# Patient Record
Sex: MALE | Race: ASIAN | NOT HISPANIC OR LATINO | Employment: FULL TIME | URBAN - METROPOLITAN AREA
[De-identification: names, ages, dates, MRNs, and addresses within clinical notes are randomized per-mention and may not be internally consistent; named-entity substitution may affect disease eponyms.]

---

## 2017-11-18 ENCOUNTER — OFFICE VISIT (OUTPATIENT)
Dept: ORTHOPEDICS | Facility: CLINIC | Age: 32
End: 2017-11-18

## 2017-11-18 VITALS
HEIGHT: 72 IN | HEART RATE: 68 BPM | BODY MASS INDEX: 23.16 KG/M2 | SYSTOLIC BLOOD PRESSURE: 136 MMHG | DIASTOLIC BLOOD PRESSURE: 87 MMHG | WEIGHT: 171 LBS

## 2017-11-18 DIAGNOSIS — M54.50 BILATERAL LOW BACK PAIN WITHOUT SCIATICA, UNSPECIFIED CHRONICITY: Primary | ICD-10-CM

## 2017-11-18 NOTE — PATIENT INSTRUCTIONS
Take ibuprofen(advil, motrin) 600mg three times daily with food     OR  Naproxen(aleve) two tabs twice daily with food     Follow up with physical therapy  Follow up in clinic in 4-6 weeks if no improvement

## 2017-11-18 NOTE — LETTER
Date:November 20, 2017      Patient was self referred, no letter generated. Do not send.        [facility] Health Information

## 2017-11-18 NOTE — MR AVS SNAPSHOT
After Visit Summary   2017    Prasanna Alegria    MRN: [id number]           Patient Information     Date Of Birth          1985        Visit Information        Provider Department      2017 9:30 AM Ck Duvall MD [facility]        Today's Diagnoses     Bilateral low back pain without sciatica, unspecified chronicity    -  1      Care Instructions    Take ibuprofen(advil, motrin) 600mg three times daily with food     OR  Naproxen(aleve) two tabs twice daily with food     Follow up with physical therapy  Follow up in clinic in 4-6 weeks if no improvement            Follow-ups after your visit        Additional Services     PHYSICAL THERAPY REFERRAL (Internal)       Physical Therapy Referral                  Who to contact     Please call your clinic at [phone] to:    Ask questions about your health    Make or cancel appointments    Discuss your medicines    Learn about your test results    Speak to your doctor   If you have compliments or concerns about an experience at your clinic, or if you wish to file a complaint, please contact [facility] Patient Relations at [phone] or email us at [email or web address]         Additional Information About Your Visit        MyChart Information     [facility] is an electronic gateway that provides easy, online access to your medical records. With Omaze, you can request a clinic appointment, read your test results, renew a prescription or communicate with your care team.     To sign up for [facility] visit the website at [email or web address]   You will be asked to enter the access code listed below, as well as some personal information. Please follow the directions to create your username and password.     Your access code is: [email or web address]  Expires: 2018 11:40 AM     Your access code will  in 90 days. If you need help or a new code, please  "contact your [facility] or call [phone] for assistance.        Care EveryWhere ID     This is your Care EveryWhere ID. This could be used by other organizations to access your [place] medical records  [id number]        Your Vitals Were     Pulse Height BMI (Body Mass Index)             68 5' 11.5\" (1.816 m) 23.52 kg/m2          Blood Pressure from Last 3 Encounters:   11/18/17 136/87    Weight from Last 3 Encounters:   11/18/17 171 lb (77.6 kg)              We Performed the Following     PHYSICAL THERAPY REFERRAL (Internal)        Primary Care Provider    None Specified       No primary provider on file.        Equal Access to Services     [facility]: Hadii khadijah Kendrick, waloganda robertadaha, alfredoybprashant kaalmada samy, binh ruelas . So [facility] [phone].    ATENCIÓN: Si habla español, tiene a oconnor disposición servicios gratuitos de asistencia lingüística. Llame al [phone].    We comply with applicable federal civil rights laws and [place] laws. We do not discriminate on the basis of race, color, national origin, age, disability, sex, sexual orientation, or gender identity.            Thank you!     Thank you for choosing [facility]  for your care. Our goal is always to provide you with excellent care. Hearing back from our patients is one way we can continue to improve our services. Please take a few minutes to complete the written survey that you may receive in the mail after your visit with us. Thank you!             Your Updated Medication List - Protect others around you: Learn how to safely use, store and throw away your medicines at www.disposemymeds.org.      Notice  As of 11/18/2017 11:40 AM    You have not been prescribed any medications.      "

## 2017-11-18 NOTE — LETTER
"11/18/2017       RE: Prasanna Alegria  [address]   [place] [address]     Dear Colleague,    Thank you for referring your patient, Prasanna Alegria, to the [facility] at [facility]. Please see a copy of my visit note below.    [facility] Walk-in Clinic Note      Patient is a 32 year old male who presents to the office today for: low back pain. Had injury in 2010 which describes as mostly muscular.  He treated with rest and physical therapy at the time which led to improvement. Since that time he has had infrequent episodes of back pain, usually only lasting a few days. He wears a back brace for any heavy lifting. Roughly 1 month ago he was doing work around the house and the following day had back pain.    Began: one month ago. No acute injury or trauma  Located:Low back, laterally. No midline pain  Quality:Achy, sore  Aggravating factors:prolonged sitting, flexion  Alleviating factors:heat, ice, has not taken any OTC medications  Prior injury: yes, 2010  Denies weakness, numbness, tingling, clicking, locking, or catching.      Past Medical History, Current Medications, and Allergies are reviewed in the electronic medical record as appropriate.     ROS: Pertinent items are noted in HPI.  Constitutional: negative for fevers, chills and malaise  Cardiovascular: negative for dyspnea, fatigue, lower extremity edema  Integument/breast: negative for rash, skin lesion(s) and skin color change  Neurological: negative for paresthesia and weakness      EXAM:/87  Pulse 68  Ht 1.816 m (5' 11.5\")  Wt 77.6 kg (171 lb)  BMI 23.52 kg/m2    General: alert, pleasant, no distress. sitting comfortably in chair  Back/Spine: no tenderness to palpation of spinous processes, or paraspinous musculature of lumbar spine. MildTTP in SI area on left. full ROM with flexion, extension, twisting, and side bending without " pain. Straight leg raise negative bilaterally. hamstring tightness noted. NO pain in back with GRETEL testing bilaterally  Neuro: SILT on bilateral lower extremities, can stand on toes and heel walk without difficulty, patellar and achilles reflexes 2+ and symmetric,    Radiographs: None      Assessment: Patient is a 32 year old male with low back pain and some findings consistent with SI joint etiology.    Recommendations:   Given referral for physical therapy  Recommended NSAIDs for pain as needed  Recommended heat prior to activity and ice afterwards.  Follow-up in 4-6 weeks if he is not having improvement        Ck Duvall MD        Again, thank you for allowing me to participate in the care of your patient.      Sincerely,    Ck Duvall MD

## 2017-11-18 NOTE — PROGRESS NOTES
"[facility] Orthopedic Walk-in Clinic Note      Patient is a 32 year old male who presents to the office today for: low back pain. Had injury in 2010 which describes as mostly muscular.  He treated with rest and physical therapy at the time which led to improvement. Since that time he has had infrequent episodes of back pain, usually only lasting a few days. He wears a back brace for any heavy lifting. Roughly 1 month ago he was doing work around the house and the following day had back pain.    Began: one month ago. No acute injury or trauma  Located:Low back, laterally. No midline pain  Quality:Achy, sore  Aggravating factors:prolonged sitting, flexion  Alleviating factors:heat, ice, has not taken any OTC medications  Prior injury: yes, 2010  Denies weakness, numbness, tingling, clicking, locking, or catching.      Past Medical History, Current Medications, and Allergies are reviewed in the electronic medical record as appropriate.     ROS: Pertinent items are noted in HPI.  Constitutional: negative for fevers, chills and malaise  Cardiovascular: negative for dyspnea, fatigue, lower extremity edema  Integument/breast: negative for rash, skin lesion(s) and skin color change  Neurological: negative for paresthesia and weakness      EXAM:/87  Pulse 68  Ht 1.816 m (5' 11.5\")  Wt 77.6 kg (171 lb)  BMI 23.52 kg/m2    General: alert, pleasant, no distress. sitting comfortably in chair  Back/Spine: no tenderness to palpation of spinous processes, or paraspinous musculature of lumbar spine. MildTTP in SI area on left. full ROM with flexion, extension, twisting, and side bending without pain. Straight leg raise negative bilaterally. hamstring tightness noted. NO pain in back with GRETEL testing bilaterally  Neuro: SILT on bilateral lower extremities, can stand on toes and heel walk without difficulty, patellar and achilles reflexes 2+ and symmetric,    Radiographs: None      Assessment: Patient is a 32 year " old male with low back pain and some findings consistent with SI joint etiology.    Recommendations:   Given referral for physical therapy  Recommended NSAIDs for pain as needed  Recommended heat prior to activity and ice afterwards.  Follow-up in 4-6 weeks if he is not having improvement        Ck Duvall MD

## 2017-12-22 ENCOUNTER — THERAPY VISIT (OUTPATIENT)
Dept: PHYSICAL THERAPY | Facility: CLINIC | Age: 32
End: 2017-12-22
Payer: COMMERCIAL

## 2017-12-22 DIAGNOSIS — M54.50 ACUTE BILATERAL LOW BACK PAIN WITHOUT SCIATICA: Primary | ICD-10-CM

## 2017-12-22 PROCEDURE — 97530 THERAPEUTIC ACTIVITIES: CPT | Mod: GP

## 2017-12-22 PROCEDURE — 97161 PT EVAL LOW COMPLEX 20 MIN: CPT | Mod: GP

## 2017-12-22 PROCEDURE — 97110 THERAPEUTIC EXERCISES: CPT | Mod: GP

## 2017-12-22 NOTE — PROGRESS NOTES
[facility] Evaluation  Subjective:    Patient is a 32 year old male presenting with rehab back hpi.   Raji Mims is a 32 year old male with a lumbar condition.  Condition occurred with:  Other reason.  Condition occurred: other.  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 Injured then.  Since then: More recent injury 4 months ago and wondering why it keeps recurring. Has occasional setbacks when playing sport (basketball) or jumping or lifting.  Does not bother at job, but does after driving (stiffness). CC: pain low back with certain movements (bending). .    Patient reports pain:  Lower lumbar spine.  Radiates to:  No radiation.  Pain is described as sharp and aching and is intermittent and reported as 8/10.  Associated symptoms:  Loss of motion/stiffness. Worse during: Will be stiffer in the AM when bothering him.  Symptoms are exacerbated by bending and certain positions and relieved by analgesics and heat.  Since onset symptoms are unchanged.  Special testing: none recently.  Previous treatment includes physical therapy.  There was mild improvement following previous treatment.                                                Objective:    System         Lumbar/SI Evaluation  ROM:  AROM Lumbar: normal    Strength: fair TA recruitment             Lumbar Palpation:  Palpation (lumbar): mild tenderness to palpation L4, L5.        Lumbar Provocation:  Lumbar provocation: standing on left leg; instability test: inconclusive.  Left positive with:  Regis w/ext                                                   General     ROS    Assessment/Plan:      Patient is a 32 year old male with lumbar complaints.    Patient has the following significant findings with corresponding treatment plan.                Diagnosis 1:  L/S pain with s&sx consistent with possible lumbar micro-instability   Pain -  self management, education  and home program  Decreased strength - therapeutic exercise and therapeutic activities  Impaired muscle performance - neuro re-education  Decreased function - therapeutic activities    Therapy Evaluation Codes:   1) History comprised of:   Personal factors that impact the plan of care:      None.    Comorbidity factors that impact the plan of care are:      None.     Medications impacting care: None.  2) Examination of Body Systems comprised of:   Body structures and functions that impact the plan of care:      Lumbar spine.   Activity limitations that impact the plan of care are:      Bending, Jumping and Sitting.  3) Clinical presentation characteristics are:   Stable/Uncomplicated.  4) Decision-Making    Low complexity using standardized patient assessment instrument and/or measureable assessment of functional outcome.  Cumulative Therapy Evaluation is: Low complexity.    Previous and current functional limitations:  (See Goal Flow Sheet for this information)    Short term and Long term goals: (See Goal Flow Sheet for this information)     Communication ability:  Patient appears to be able to clearly communicate and understand verbal and written communication and follow directions correctly.  Treatment Explanation - The following has been discussed with the patient:   RX ordered/plan of care  Anticipated outcomes  Possible risks and side effects  This patient would benefit from PT intervention to resume normal activities.   Rehab potential is good.    Frequency:  1 X week, once daily  Duration:  for 6 weeks  Discharge Plan:  Achieve all LTG.  Independent in home treatment program.  Reach maximal therapeutic benefit.    Please refer to the daily flowsheet for treatment today, total treatment time and time spent performing 1:1 timed codes.

## 2017-12-22 NOTE — MR AVS SNAPSHOT
"              After Visit Summary   12/22/2017    Raji Mims    MRN: [id number]           Patient Information     Date Of Birth          1985        Visit Information        Provider Department      12/22/2017 8:20 AM Miguelangel Emery, OMARI GRANT [facility]        Today's Diagnoses     Acute bilateral low back pain without sciatica    -  1       Follow-ups after your visit        Your next 10 appointments already scheduled     Dec 29, 2017  4:50 PM CST   [facility] Spine with OMARI Cruz [facility] ([facility])    [address] [place] [id number] [phone]              Who to contact     If you have questions or need follow up information about today's clinic visit or your schedule please contact [facility] PH[facility] THERAP[facility] directly at [phone].  Normal or non-critical lab and imaging results will be communicated to you by MyChart, letter or phone within 4 business days after the clinic has received the results. If you do not hear from us within 7 days, please contact the clinic through Tell[facility]par[facility] or phone. If you have a critical or abnormal lab result, we will notify you by phone as soon as possible.  Submit refill requests through [facility] or call your pharmacy and they will forward the refill request to us. Please allow 3 business days for your refill to be completed.          Additional Information About Your Visit        MyChart Information     [facility] lets you send messages to your doctor, view your test results, renew your prescriptions, schedule appointments and more. To sign up, go to [email or web address] . Click on \"Log in\" on the left side of the screen, which will take you to the Welcome page. Then click on \"Sign up Now\" on the right side of the page.     You will be asked to enter the access code listed below, as well as some personal information. Please follow the directions to create " your username and password.     Your access code is: [email or web address]  Expires: 2018 11:40 AM     Your access code will  in 90 days. If you need help or a new code, please call your [facility] clinic or [phone].        Care EveryWhere ID     This is your Care EveryWhere ID. This could be used by other organizations to access your [facility] medical records  [id number]         Blood Pressure from Last 3 Encounters:   17 136/87    Weight from Last 3 Encounters:   17 77.6 kg (171 lb)              We Performed the Following     HC PT EVAL, LOW COMPLEXITY     THERAPEUTIC ACTIVITIES     THERAPEUTIC EXERCISES        Primary Care Provider    None Specified       No primary provider on file.        Equal Access to Services     [facility] : Gonzalo Kendrick, waleno escamilla, qasusyta kaalmada samy, binh ruelas . So [facility] [phone].    ATENCIÓN: Si habla español, tiene a oconnor disposición servicios gratuitos de asistencia lingüística. Llame al [phone].    We comply with applicable federal civil rights laws and [place] laws. We do not discriminate on the basis of race, color, national origin, age, disability, sex, sexual orientation, or gender identity.            Thank you!     Thank you for choosing [facility] PHYSICAL THERAP[facility]  for your care. Our goal is always to provide you with excellent care. Hearing back from our patients is one way we can continue to improve our services. Please take a few minutes to complete the written survey that you may receive in the mail after your visit with us. Thank you!             Your Updated Medication List - Protect others around you: Learn how to safely use, store and throw away your medicines at www.disposemymeds.org.      Notice  As of 2017  9:40 AM    You have not been prescribed any medications.

## 2018-03-30 ENCOUNTER — OFFICE VISIT - HEALTHEAST (OUTPATIENT)
Dept: FAMILY MEDICINE | Facility: CLINIC | Age: 33
End: 2018-03-30

## 2018-03-30 DIAGNOSIS — K62.5 BRBPR (BRIGHT RED BLOOD PER RECTUM): ICD-10-CM

## 2018-03-30 DIAGNOSIS — K59.00 CONSTIPATION: ICD-10-CM

## 2018-03-30 DIAGNOSIS — E78.1 HYPERTRIGLYCERIDEMIA: ICD-10-CM

## 2018-03-30 LAB
CHOLEST SERPL-MCNC: 228 MG/DL
FASTING STATUS PATIENT QL REPORTED: NO
HDLC SERPL-MCNC: 31 MG/DL
LDLC SERPL CALC-MCNC: 147 MG/DL
TRIGL SERPL-MCNC: 249 MG/DL

## 2018-03-30 ASSESSMENT — MIFFLIN-ST. JEOR: SCORE: 1682.88

## 2018-04-02 ENCOUNTER — COMMUNICATION - HEALTHEAST (OUTPATIENT)
Dept: FAMILY MEDICINE | Facility: CLINIC | Age: 33
End: 2018-04-02

## 2018-04-16 ENCOUNTER — AMBULATORY - HEALTHEAST (OUTPATIENT)
Dept: FAMILY MEDICINE | Facility: CLINIC | Age: 33
End: 2018-04-16

## 2018-04-16 DIAGNOSIS — E78.1 HYPERTRIGLYCERIDEMIA: ICD-10-CM

## 2018-04-27 ENCOUNTER — AMBULATORY - HEALTHEAST (OUTPATIENT)
Dept: LAB | Facility: CLINIC | Age: 33
End: 2018-04-27

## 2018-04-27 DIAGNOSIS — E78.1 HYPERTRIGLYCERIDEMIA: ICD-10-CM

## 2018-04-27 LAB
CHOLEST SERPL-MCNC: 230 MG/DL
FASTING STATUS PATIENT QL REPORTED: YES
HDLC SERPL-MCNC: 29 MG/DL
LDLC SERPL CALC-MCNC: 145 MG/DL
TRIGL SERPL-MCNC: 280 MG/DL

## 2018-05-02 ENCOUNTER — COMMUNICATION - HEALTHEAST (OUTPATIENT)
Dept: FAMILY MEDICINE | Facility: CLINIC | Age: 33
End: 2018-05-02

## 2018-05-02 DIAGNOSIS — E78.2 MIXED HYPERLIPIDEMIA: ICD-10-CM

## 2018-05-18 ENCOUNTER — COMMUNICATION - HEALTHEAST (OUTPATIENT)
Dept: ADMINISTRATIVE | Facility: CLINIC | Age: 33
End: 2018-05-18

## 2021-06-01 VITALS — WEIGHT: 161.75 LBS | BODY MASS INDEX: 22.65 KG/M2 | HEIGHT: 71 IN

## 2021-06-17 NOTE — PROGRESS NOTES
"CHOLO Ruvalcaba is a 32 y.o. male here for follow up on high triglycerides. He went to [facility] in 12/2017 and they were very high (469, reviewed in Care Everywhere). He knows there is  high cholesterol in his family. He states that when they were last checked, it was the the end of the holiday month and he had been eating a lot and not exercising. He weighed 179 lbs at that visit and has lost 18 lbs since that time by doing high intensity exercise and trying to avoid unhealthy food.     Today, he is not fasting- he had some tea and a muffin for breakfast. Would like a call with results.     Also states that in 12/2017 he had had several episodes of bright red blood in his stool and went to GI at [facility]. He was constipated. Since that time, his constipation has resolved with drinking more water and he has had no further blood in his stool. He is wondering if needs a colonoscopy. He has no family history of colon cancer.     Past Medical History:   Diagnosis Date     Hypertriglyceridemia      No current outpatient prescriptions on file prior to visit.     No current facility-administered medications on file prior to visit.        Past medical, surgical, family and social history reviewed, updated in record.   ?  ROS:   GI: No constipation, diarrhea, or abdominal pain. No blood in stool.   CV: no exercise intolerance- no chest pain or trouble breathing  ?  O  /86  Pulse 60  Temp 98.2  F (36.8  C) (Oral)   Resp 12  Ht 5' 10.5\" (1.791 m)  Wt 161 lb 12 oz (73.4 kg)  BMI 22.88 kg/m2   Vitals reviewed. Nursing note reviewed.  General Appearance: Pleasant and alert, in no acute distress  HEENT: mucous membranes moist  Skin: warm, dry, intact, no rash noted  Neuro: no focal deficits, CNs II-XII normal.   A/P  Raji was seen today for labs only.    Diagnoses and all orders for this visit:    Hypertriglyceridemia: will recheck today. If triglycerides are still elevated, could check completely " fasting to get most accurate measurement.   -     Lipid Profile  BRBPR (bright red blood per rectum)/constipation: constipation and bleeding have resolved with increased water intake. We can presume that the bleeding was due to a fissue or hemorrhoids caused by his constipation. Advised that if this recurs, he should come back to clinic and we should discuss possibly colonoscopy, but ok to hold off for now.       Options for treatment and follow-up care were reviewed with the patient and/or guardian. Raji Ruvalcaba and/or guardian engaged in the decision making process and verbalized understanding of the options discussed and agreed with the final plan.    Veronica Ring MD

## 2021-06-23 ENCOUNTER — OFFICE VISIT (OUTPATIENT)
Dept: URGENT CARE | Facility: URGENT CARE | Age: 36
End: 2021-06-23
Payer: COMMERCIAL

## 2021-06-23 VITALS
BODY MASS INDEX: 24.33 KG/M2 | HEART RATE: 66 BPM | TEMPERATURE: 98.1 F | OXYGEN SATURATION: 99 % | WEIGHT: 172 LBS | DIASTOLIC BLOOD PRESSURE: 78 MMHG | SYSTOLIC BLOOD PRESSURE: 118 MMHG

## 2021-06-23 DIAGNOSIS — S91.332A PUNCTURE WOUND OF LEFT FOOT, INITIAL ENCOUNTER: Primary | ICD-10-CM

## 2021-06-23 PROBLEM — E66.3 OVERWEIGHT: Status: ACTIVE | Noted: 2017-12-27

## 2021-06-23 PROBLEM — K62.5 BRBPR (BRIGHT RED BLOOD PER RECTUM): Status: ACTIVE | Noted: 2017-12-27

## 2021-06-23 PROBLEM — Z83.438 FH: HYPERLIPIDEMIA: Status: ACTIVE | Noted: 2017-12-27

## 2021-06-23 PROBLEM — J30.89 CHRONIC NON-SEASONAL ALLERGIC RHINITIS: Status: ACTIVE | Noted: 2017-12-27

## 2021-06-23 PROBLEM — E78.1 HYPERTRIGLYCERIDEMIA: Status: ACTIVE | Noted: 2018-03-30

## 2021-06-23 PROBLEM — H61.23 BILATERAL IMPACTED CERUMEN: Status: ACTIVE | Noted: 2017-12-27

## 2021-06-23 PROCEDURE — 90471 IMMUNIZATION ADMIN: CPT | Performed by: NURSE PRACTITIONER

## 2021-06-23 PROCEDURE — 90715 TDAP VACCINE 7 YRS/> IM: CPT | Performed by: NURSE PRACTITIONER

## 2021-06-23 PROCEDURE — 99202 OFFICE O/P NEW SF 15 MIN: CPT | Mod: 25 | Performed by: NURSE PRACTITIONER

## 2021-06-23 ASSESSMENT — ENCOUNTER SYMPTOMS
NAUSEA: 0
ADENOPATHY: 0
DIAPHORESIS: 0
PARESTHESIAS: 0
CHEST TIGHTNESS: 0
LIGHT-HEADEDNESS: 0
MYALGIAS: 0
WOUND: 1
CHILLS: 0
SLEEP DISTURBANCE: 0
DIZZINESS: 0
VOMITING: 0
FATIGUE: 0
ABDOMINAL PAIN: 0
ARTHRALGIAS: 0
PALPITATIONS: 0
WEAKNESS: 0
APPETITE CHANGE: 0
SHORTNESS OF BREATH: 0
FEVER: 0
ACTIVITY CHANGE: 0
WHEEZING: 0

## 2021-06-23 NOTE — PROGRESS NOTES
Chief Complaint   Patient presents with     Urgent Care     Puncture Wound     c/o stepped on nail today      SUBJECTIVE:  Raji Ruvalcaba is a 35 year old male who presents to the clinic today after stepping on a nail. His left foot has a very small pink puncture gretchen. The nail is silver and decently clean appearing.  Denies numbness, tingling, lost ROM. He is not sure when [place]    No past medical history on file.  No current outpatient medications on file prior to visit.  No current facility-administered medications on file prior to visit.     Social History     Tobacco Use     Smoking status: Never Smoker     Smokeless tobacco: Never Used   Substance Use Topics     Alcohol use: Not on file     No Known Allergies    Review of Systems   Constitutional: Negative for activity change, appetite change, chills, diaphoresis, fatigue and fever.   Respiratory: Negative for chest tightness, shortness of breath and wheezing.    Cardiovascular: Negative for palpitations.   Gastrointestinal: Negative for abdominal pain, nausea and vomiting.   Musculoskeletal: Negative for arthralgias, gait problem and myalgias.   Skin: Positive for wound. Negative for rash.   Neurological: Negative for dizziness, weakness, light-headedness and paresthesias.   Hematological: Negative for adenopathy.   Psychiatric/Behavioral: Negative for sleep disturbance.     EXAM:   /78   Pulse 66   Temp 98.1  F (36.7  C) (Tympanic)   Wt 78 kg (172 lb)   SpO2 99%   BMI 24.33 kg/m      Physical Exam  Vitals signs reviewed.   Constitutional:       General: He is not in acute distress.     Appearance: Normal appearance. He is not ill-appearing, toxic-appearing or diaphoretic.   HENT:      Head: Normocephalic and atraumatic.      Nose: Nose normal.      Mouth/Throat:      Mouth: Mucous membranes are moist.      Pharynx: Oropharynx is clear.   Eyes:      Extraocular Movements: Extraocular  movements intact.      Conjunctiva/sclera: Conjunctivae normal.      Pupils: Pupils are equal, round, and reactive to light.   Neck:      Musculoskeletal: Normal range of motion and neck supple.   Cardiovascular:      Rate and Rhythm: Normal rate.   Pulmonary:      Effort: Pulmonary effort is normal.   Musculoskeletal: Normal range of motion.         General: Tenderness and signs of injury present. No swelling.      Comments: Left foot plantar aspect with pinpoint pink puncture wound.   Skin:     General: Skin is warm and dry.      Findings: Erythema present. No rash.   Neurological:      General: No focal deficit present.      Mental Status: He is alert and oriented to person, place, and time.   Psychiatric:         Mood and Affect: Mood normal.         Behavior: Behavior normal.       ASSESSMENT:    ICD-10-CM    1. Puncture wound of left foot, initial encounter  S91.332A TDAP VACCINE (Adacel, Boostrix)  [[id number]]     PLAN:  Patient Instructions   Tdap updated today  Wound cleansed  Epsom salt soapy water soaks and bandaid for 3-5 days  Reevaluation if signs of infection such as redness, pus, warmth, fever    Follow up with primary care provider with any problems, questions or concerns or if symptoms worsen or fail to improve. Patient agreed to plan and verbalized understanding.    AJ Cornell-BC  [facility]

## 2021-06-23 NOTE — PATIENT INSTRUCTIONS
Tdap updated today  Wound cleansed  Epsom salt soapy water soaks and bandaid for 3-5 days  Reevaluation if signs of infection such as redness, pus, warmth, fever

## 2021-06-26 ENCOUNTER — HEALTH MAINTENANCE LETTER (OUTPATIENT)
Age: 36
End: 2021-06-26

## 2021-10-16 ENCOUNTER — HEALTH MAINTENANCE LETTER (OUTPATIENT)
Age: 36
End: 2021-10-16

## 2022-05-05 ENCOUNTER — OFFICE VISIT (OUTPATIENT)
Dept: URGENT CARE | Facility: URGENT CARE | Age: 37
End: 2022-05-05
Payer: COMMERCIAL

## 2022-05-05 VITALS
OXYGEN SATURATION: 98 % | HEART RATE: 91 BPM | DIASTOLIC BLOOD PRESSURE: 81 MMHG | SYSTOLIC BLOOD PRESSURE: 127 MMHG | TEMPERATURE: 98 F | BODY MASS INDEX: 26.03 KG/M2 | WEIGHT: 184 LBS

## 2022-05-05 DIAGNOSIS — K64.5 THROMBOSED EXTERNAL HEMORRHOIDS: Primary | ICD-10-CM

## 2022-05-05 PROCEDURE — 99213 OFFICE O/P EST LOW 20 MIN: CPT | Performed by: FAMILY MEDICINE

## 2022-05-07 NOTE — PROGRESS NOTES
Assessment & Plan     Thrombosed external hemorrhoids  This seems to be resolving on its own. No sign infection. Conservative treatment measures discussed. educational info given.        [id number]}    Return in about 1 week (around 5/12/2022) for follow up with your regular clinic if needed.    Gray Kline MD  [facility] [place]    ------------------------------------------------------------------------  Subjective     Raji Ruvalcaba presents to clinic today for the following health issues:  chief complaint  HPI  Suspected hemorrhoid bulging for about 4-5 days painful then started bleding these past 24 hours. Mildly. The pain has gone down.     No bleeding diathesis nor anticoagulants. Felt a litle feverish today and wanted to be checked for infection.       Review of Systems        Objective    /81   Pulse 91   Temp 98  F (36.7  C) (Temporal)   Wt 83.5 kg (184 lb)   SpO2 98%   BMI 26.03 kg/m    Physical Exam   GENERAL: healthy, alert and no distress  RECTAL (male): ext hemorrhoid noted. About 1cm in size. right side with area of bleeding. Overall soft and mildly tender. No surrounding erythema/fluctuance to suggest infection/abscess

## 2022-05-16 ENCOUNTER — OFFICE VISIT (OUTPATIENT)
Dept: URGENT CARE | Facility: URGENT CARE | Age: 37
End: 2022-05-16
Payer: COMMERCIAL

## 2022-05-16 VITALS
HEART RATE: 96 BPM | WEIGHT: 184 LBS | DIASTOLIC BLOOD PRESSURE: 72 MMHG | BODY MASS INDEX: 25.76 KG/M2 | RESPIRATION RATE: 16 BRPM | HEIGHT: 71 IN | SYSTOLIC BLOOD PRESSURE: 110 MMHG | TEMPERATURE: 99.5 F | OXYGEN SATURATION: 95 %

## 2022-05-16 DIAGNOSIS — R05.9 COUGH: Primary | ICD-10-CM

## 2022-05-16 DIAGNOSIS — J06.9 VIRAL URI: ICD-10-CM

## 2022-05-16 LAB
FLUAV AG SPEC QL IA: NEGATIVE
FLUBV AG SPEC QL IA: NEGATIVE
SARS-COV-2 RNA RESP QL NAA+PROBE: NEGATIVE

## 2022-05-16 PROCEDURE — U0003 INFECTIOUS AGENT DETECTION BY NUCLEIC ACID (DNA OR RNA); SEVERE ACUTE RESPIRATORY SYNDROME CORONAVIRUS 2 (SARS-COV-2) (CORONAVIRUS DISEASE [COVID-19]), AMPLIFIED PROBE TECHNIQUE, MAKING USE OF HIGH THROUGHPUT TECHNOLOGIES AS DESCRIBED BY CMS-2020-01-R: HCPCS | Performed by: PHYSICIAN ASSISTANT

## 2022-05-16 PROCEDURE — 99213 OFFICE O/P EST LOW 20 MIN: CPT | Performed by: PHYSICIAN ASSISTANT

## 2022-05-16 PROCEDURE — 87804 INFLUENZA ASSAY W/OPTIC: CPT | Performed by: PHYSICIAN ASSISTANT

## 2022-05-16 PROCEDURE — U0005 INFEC AGEN DETEC AMPLI PROBE: HCPCS | Performed by: PHYSICIAN ASSISTANT

## 2022-05-16 RX ORDER — ACETAMINOPHEN 325 MG/1
325-650 TABLET ORAL EVERY 6 HOURS PRN
COMMUNITY

## 2022-05-16 RX ORDER — CETIRIZINE HYDROCHLORIDE 10 MG/1
10 TABLET ORAL DAILY
COMMUNITY

## 2022-05-16 NOTE — PROGRESS NOTES
"Chief Complaint   Patient presents with     Urgent Care     Fever     Fever and some shortness of breath, headache. Started last night. Some body aches and coughing. Tested neg at home this morning for covid.        ASSESSMENT/PLAN:  Raji was seen today for urgent care and fever.    Diagnoses and all orders for this visit:    Cough  -     Symptomatic; Unknown COVID-19 Virus (Coronavirus) by PCR Nose  -     Influenza A & B Antigen - Clinic Collect    Viral URI    Rapid flu negative.  Rapid COVID-negative.  Both daughter and wife had similar symptoms.  I think COVID is the most likely explanation given the negative flu.  PCR test pending.  No signs of pneumonia and normal vitals today.  Treat supportively.  Discussed return precautions    Piotr Diaz PA-C      SUBJECTIVE:  Raji is a 36 year old male who presents to urgent care with fever that started last night along with some mild malaise, fatigue and feeling short of breath.  He had some headache.  Has some postnasal drainage and mucus production.  Deep breaths will cause coughs.  No chest pain or pressure.  Felt a little bit better this morning and does not seem to have a fever.  He took a COVID test this morning and was negative.  His wife has similar symptoms but also had a little bit of diarrhea.  Daughter seems to have similar symptoms but no fever.     ROS: Pertinent ROS neg other than the symptoms noted above in the HPI.     OBJECTIVE:  /72   Pulse 96   Temp 99.5  F (37.5  C) (Temporal)   Resp 16   Ht 1.803 m (5' 11\")   Wt 83.5 kg (184 lb)   SpO2 95%   BMI 25.66 kg/m     GENERAL: healthy, alert and no distress  EYES: Eyes grossly normal to inspection, PERRL and conjunctivae and sclerae normal  HENT: ear canals and TM's normal, nose and mouth without ulcers or lesions, no significant posteropharynx erythema  RESP: lungs clear to auscultation - no rales, rhonchi or wheezes, cough heard during the exam  CV: regular rate and rhythm, normal " S1 S2, no S3 or S4, no murmur, click or rub    DIAGNOSTICS    Results for orders placed or performed in visit on 05/16/22   Influenza A & B Antigen - Clinic Collect     Status: Normal    Specimen: Nasopharyngeal; Swab   Result Value Ref Range    Influenza A antigen Negative Negative    Influenza B antigen Negative Negative    Narrative    Test results must be correlated with clinical data. If necessary, results should be confirmed by a molecular assay or viral culture.        Current Outpatient Medications   Medication     acetaminophen (TYLENOL) 325 MG tablet     cetirizine (ZYRTEC) 10 MG tablet     No current facility-administered medications for this visit.      Patient Active Problem List   Diagnosis     Acute bilateral low back pain without sciatica     Bilateral impacted cerumen     BRBPR (bright red blood per rectum)     Chronic non-seasonal allergic rhinitis     FH: hyperlipidemia     Hypertriglyceridemia     Overweight      No past medical history on file.  Past Surgical History:   Procedure Laterality Date     NO PAST SURGERIES       Family History   Problem Relation Age of Onset     Hyperlipidemia Mother      Hyperlipidemia Father      Social History     Tobacco Use     Smoking status: Never Smoker     Smokeless tobacco: Never Used   Substance Use Topics     Alcohol use: Not on file              The plan of care was discussed with the patient. They understand and agree with the course of treatment prescribed. A printed summary was given including instructions and medications.  The use of Dragon/[employer] dictation services may have been used to construct the content in this note; any grammatical or spelling errors are non-intentional. Please contact the author of this note directly if you are in need of any clarification.

## 2022-06-01 ENCOUNTER — LAB REQUISITION (OUTPATIENT)
Dept: LAB | Facility: CLINIC | Age: 37
End: 2022-06-01
Payer: COMMERCIAL

## 2022-06-01 DIAGNOSIS — R05.3 CHRONIC COUGH: ICD-10-CM

## 2022-06-01 DIAGNOSIS — E78.5 HYPERLIPIDEMIA, UNSPECIFIED: ICD-10-CM

## 2022-06-01 LAB
CHOLEST SERPL-MCNC: 258 MG/DL
FASTING STATUS PATIENT QL REPORTED: ABNORMAL
HDLC SERPL-MCNC: 33 MG/DL
LDLC SERPL CALC-MCNC: 153 MG/DL
TRIGL SERPL-MCNC: 361 MG/DL

## 2022-06-01 PROCEDURE — 86003 ALLG SPEC IGE CRUDE XTRC EA: CPT | Mod: ORL | Performed by: FAMILY MEDICINE

## 2022-06-01 PROCEDURE — 80061 LIPID PANEL: CPT | Mod: ORL | Performed by: FAMILY MEDICINE

## 2022-06-03 LAB
A ALTERNATA IGE QN: <0.1 KU(A)/L
A FUMIGATUS IGE QN: <0.1 KU(A)/L
ALMOND IGE QN: <0.1 KU(A)/L
BERMUDA GRASS IGE QN: <0.1 KU(A)/L
C HERBARUM IGE QN: <0.1 KU(A)/L
CASHEW NUT IGE QN: <0.1 KU(A)/L
CAT DANDER IGG QN: <0.1 KU(A)/L
CEDAR IGE QN: <0.1 KU(A)/L
CODFISH IGE QN: <0.1 KU(A)/L
COMMON RAGWEED IGE QN: <0.1 KU(A)/L
COTTONWOOD IGE QN: <0.1 KU(A)/L
COW MILK IGE QN: <0.1 KU(A)/L
D FARINAE IGE QN: <0.1 KU(A)/L
D PTERONYSS IGE QN: <0.1 KU(A)/L
DOG DANDER+EPITH IGE QN: <0.1 KU(A)/L
EGG WHITE IGE QN: <0.1 KU(A)/L
HAZELNUT IGE QN: <0.1 KU(A)/L
IGE SERPL-ACNC: 7 KU/L (ref 0–114)
IGE SERPL-ACNC: 7 KU/L (ref 0–114)
MAPLE IGE QN: <0.1 KU(A)/L
MARSH ELDER IGE QN: <0.1 KU(A)/L
MOUSE URINE PROT IGE QN: <0.1 KU(A)/L
NETTLE IGE QN: <0.1 KU(A)/L
P NOTATUM IGE QN: <0.1 KU(A)/L
PEANUT IGE QN: <0.1 KU(A)/L
ROACH IGE QN: <0.1 KU(A)/L
SALMON IGE QN: <0.1 KU(A)/L
SALTWORT IGE QN: <0.1 KU(A)/L
SCALLOP IGE QN: <0.1 KU(A)/L
SESAME SEED IGE QN: <0.1 KU(A)/L
SHRIMP IGE QN: <0.1 KU(A)/L
SILVER BIRCH IGE QN: <0.1 KU(A)/L
SOYBEAN IGE QN: <0.1 KU(A)/L
TIMOTHY IGE QN: <0.1 KU(A)/L
TUNA IGE QN: <0.1 KU(A)/L
WALNUT IGE QN: <0.1 KU(A)/L
WHEAT IGE QN: <0.1 KU(A)/L
WHITE ASH IGE QN: <0.1 KU(A)/L
WHITE ELM IGE QN: <0.1 KU(A)/L
WHITE MULBERRY IGE QN: <0.1 KU(A)/L
WHITE OAK IGE QN: <0.1 KU(A)/L

## 2022-07-17 ENCOUNTER — HEALTH MAINTENANCE LETTER (OUTPATIENT)
Age: 37
End: 2022-07-17

## 2022-09-25 ENCOUNTER — HEALTH MAINTENANCE LETTER (OUTPATIENT)
Age: 37
End: 2022-09-25

## 2023-07-27 ENCOUNTER — OFFICE VISIT (OUTPATIENT)
Dept: URGENT CARE | Facility: URGENT CARE | Age: 38
End: 2023-07-27
Payer: COMMERCIAL

## 2023-07-27 ENCOUNTER — ANCILLARY PROCEDURE (OUTPATIENT)
Dept: GENERAL RADIOLOGY | Facility: CLINIC | Age: 38
End: 2023-07-27
Attending: PHYSICIAN ASSISTANT
Payer: COMMERCIAL

## 2023-07-27 VITALS
HEART RATE: 85 BPM | OXYGEN SATURATION: 98 % | TEMPERATURE: 97.5 F | DIASTOLIC BLOOD PRESSURE: 84 MMHG | SYSTOLIC BLOOD PRESSURE: 136 MMHG

## 2023-07-27 DIAGNOSIS — M79.671 RIGHT FOOT PAIN: ICD-10-CM

## 2023-07-27 DIAGNOSIS — S93.601A SPRAIN OF RIGHT FOOT, INITIAL ENCOUNTER: Primary | ICD-10-CM

## 2023-07-27 PROCEDURE — 99213 OFFICE O/P EST LOW 20 MIN: CPT | Performed by: PHYSICIAN ASSISTANT

## 2023-07-27 PROCEDURE — 73630 X-RAY EXAM OF FOOT: CPT | Mod: TC | Performed by: RADIOLOGY

## 2023-07-27 NOTE — PROGRESS NOTES
Assessment & Plan       1. Sprain of right foot, initial encounter    -No evidence of acute fracture on the x-ray.  Foot pain most likely due to soft tissue injury.  -Patient advised to purchase an ankle foot brace at [facility] or [facility] to use for comfort and support.  Patient advised not to wear the brace all the time.  Patient to follow-up with Podiatry for further instructions, evaluation and treatment  - Ankle/Foot Bracing Supplies DME Ankle Brace; Right.   - Orthopedic  Referral; Future    2. Right foot pain    - XR Foot Right G/E 3 Views shows no evidence of acute fracture  - Orthopedic  Referral; Future     Results for orders placed or performed in visit on 07/27/23   XR Foot Right G/E 3 Views     Status: None    Narrative    XR FOOT RIGHT G/E 3 VIEWS   7/27/2023 3:05 PM     HISTORY: Right foot pain  COMPARISON: None.       Impression    IMPRESSION: No acute fracture or dislocation. There is mild chronic  irregularity and spurring of the navicular bone and medial cuneiform  bone about the medial aspect of the naviculocuneiform joint. Small  chronic bone fragments projecting near the medial malleolus. Achilles  calcaneal spur.    KEYONNA DWYER MD         SYSTEM ID:  [id number]       Patient Instructions   Acetaminophen and ibuprofen as needed for pain  Wear the ankle/ foot brace for comfort and support when walking.  Do not wear all the time  Follow-up with the Podiatrist for evaluation and treatment      Return for Follow up with Podiatry.    At the end of the encounter, I discussed results, diagnosis, medications. Discussed red flags for immediate return to clinic/ER, as well as indications for follow up if no improvement. Patient understood and agreed to plan. Patient was stable for discharge.    Benny Alegria is a 37 year old male who presents to clinic today for the following health issues:  Chief Complaint   Patient presents with    Urgent Care    Toe Injury     Right Middle  toes injured in his sleep 3 weeks ago.     HPI    Patient reports right foot pain which started 3 weeks ago.  He reports injuring his foot while sleeping.  He notes symptoms started worsening about 5 days ago.  He reports pain on the dorsal surface of the foot and swelling.  He denies injury.  He has been icing and taking ibuprofen and also using the walking boot.     Review of Systems   Musculoskeletal:  Positive for gait problem.       Problem List:  2018-03: Hypertriglyceridemia  2017-12: Bilateral impacted cerumen  2017-12: BRBPR (bright red blood per rectum)  2017-12: Chronic non-seasonal allergic rhinitis  2017-12: FH: hyperlipidemia  2017-12: Overweight  2017-12: Acute bilateral low back pain without sciatica      No past medical history on file.    Social History     Tobacco Use    Smoking status: Never    Smokeless tobacco: Never   Substance Use Topics    Alcohol use: Not on file           Objective    /84   Pulse 85   Temp 97.5  F (36.4  C) (Temporal)   SpO2 98%   Physical Exam  Vitals and nursing note reviewed.   Constitutional:       Appearance: Normal appearance.   Cardiovascular:      Rate and Rhythm: Normal rate and regular rhythm.   Pulmonary:      Effort: Pulmonary effort is normal.      Breath sounds: Normal breath sounds.   Musculoskeletal:         General: Normal range of motion.      Cervical back: Normal range of motion and neck supple.      Right foot: Swelling and tenderness present.      Comments: Tenderness on the dorsal surface of the right foot.  Swelling present   Skin:     General: Skin is warm and dry.      Findings: No rash.   Neurological:      General: No focal deficit present.      Mental Status: He is alert and oriented to person, place, and time.      Cranial Nerves: No cranial nerve deficit.      Sensory: Sensation is intact.      Motor: Motor function is intact.      Gait: Gait abnormal.      Deep Tendon Reflexes:      Reflex Scores:       Patellar reflexes are 2+ on  the right side.       Achilles reflexes are 2+ on the right side.     Comments: Patient using a walking boot   Psychiatric:         Mood and Affect: Mood normal.         Behavior: Behavior normal.              Treasure Becker PA-C

## 2023-07-27 NOTE — PATIENT INSTRUCTIONS
Acetaminophen and ibuprofen as needed for pain  Wear the ankle/ foot brace for comfort and support when walking.  Do not wear all the time  Follow-up with the Podiatrist for evaluation and treatment

## 2023-08-02 ENCOUNTER — OFFICE VISIT (OUTPATIENT)
Dept: PODIATRY | Facility: CLINIC | Age: 38
End: 2023-08-02
Attending: PHYSICIAN ASSISTANT
Payer: COMMERCIAL

## 2023-08-02 VITALS
HEIGHT: 71 IN | SYSTOLIC BLOOD PRESSURE: 110 MMHG | WEIGHT: 184 LBS | BODY MASS INDEX: 25.76 KG/M2 | DIASTOLIC BLOOD PRESSURE: 72 MMHG

## 2023-08-02 DIAGNOSIS — S93.601A SPRAIN OF RIGHT FOOT, INITIAL ENCOUNTER: ICD-10-CM

## 2023-08-02 DIAGNOSIS — M77.51 TENDINITIS OF RIGHT FOOT: Primary | ICD-10-CM

## 2023-08-02 DIAGNOSIS — M79.671 RIGHT FOOT PAIN: ICD-10-CM

## 2023-08-02 DIAGNOSIS — G57.81 INTERDIGITAL NEUROMA OF RIGHT FOOT: ICD-10-CM

## 2023-08-02 PROCEDURE — 99203 OFFICE O/P NEW LOW 30 MIN: CPT | Performed by: PODIATRIST

## 2023-08-02 RX ORDER — DICLOFENAC SODIUM 75 MG/1
75 TABLET, DELAYED RELEASE ORAL 2 TIMES DAILY
Qty: 28 TABLET | Refills: 0 | Status: SHIPPED | OUTPATIENT
Start: 2023-08-02 | End: 2023-08-16

## 2023-08-02 NOTE — PROGRESS NOTES
PATIENT HISTORY:  Treasure Becker PA-C requested I see this patient for their foot issue.  Raji Ruvalcaba is a 37 year old male who presents to clinic for right foot pain/strain.  Patient states that the foot pain started about 3 weeks ago.  States when he was sleeping he felt some discomfort but woke up and was able to walk however by the end of the day it was quite sore.  This was about a month ago.  Notes it is an aching pain.  Can be 6 out of 10.  Worse if he walks a lot.  He has been icing and using ibuprofen.  Denies specific injury.  Did have x-rays.  Wondering what is causing the pain and what can be done for it.    Review of Systems:  Patient denies fever, chills, rash, wound, stiffness,numbness, weakness, heart burn, blood in stool, chest pain with activity, calf pain when walking, shortness of breath with activity, chronic cough, easy bleeding/bruising, swelling of ankles, excessive thirst, fatigue, depression, anxiety.  Patient admits to limping at times.     PAST MEDICAL HISTORY: No past medical history on file.     PAST SURGICAL HISTORY:   Past Surgical History:   Procedure Laterality Date    NO PAST SURGERIES          MEDICATIONS:   Current Outpatient Medications:     acetaminophen (TYLENOL) 325 MG tablet, Take 325-650 mg by mouth every 6 hours as needed for mild pain, Disp: , Rfl:     cetirizine (ZYRTEC) 10 MG tablet, Take 10 mg by mouth daily, Disp: , Rfl:      ALLERGIES:  No Known Allergies     SOCIAL HISTORY:   Social History     Socioeconomic History    Marital status:      Spouse name: Not on file    Number of children: Not on file    Years of education: Not on file    Highest education level: Not on file   Occupational History    Not on file   Tobacco Use    Smoking status: Never    Smokeless tobacco: Never   Substance and Sexual Activity    Alcohol use: Not on file    Drug use: Not on file    Sexual activity: Not on file   Other Topics Concern    Not on file   Social  "History Narrative    Not on file     Social Determinants of Health     Financial Resource Strain: Not on file   Food Insecurity: Not on file   Transportation Needs: Not on file   Physical Activity: Not on file   Stress: Not on file   Social Connections: Not on file   Intimate Partner Violence: Not on file   Housing Stability: Not on file        FAMILY HISTORY:   Family History   Problem Relation Age of Onset    Hyperlipidemia Mother     Hyperlipidemia Father         EXAM:Vitals: /72   Ht 1.803 m (5' 11\")   Wt 83.5 kg (184 lb)   BMI 25.66 kg/m      General appearance: Patient is alert and fully cooperative with history & exam.  No sign of distress is noted during the visit.     Psychiatric: Affect is pleasant & appropriate.  Patient appears motivated to improve health.     Respiratory: Breathing is regular & unlabored while sitting.     HEENT: Hearing is intact to spoken word.  Speech is clear.  No gross evidence of visual impairment that would impact ambulation.     Dermatologic: Skin is intact to both lower extremities without significant lesions, rash or abrasion.  No paronychia or evidence of soft tissue infection is noted.     Vascular: DP & PT pulses are intact & regular bilaterally.  No significant edema or varicosities noted.  CFT and skin temperature is normal to both lower extremities.     Neurologic: Lower extremity sensation is intact to light touch.  No evidence of weakness or contracture in the lower extremities.  No evidence of neuropathy.     Musculoskeletal: Patient is ambulatory without assistive device or brace.  Pain on palpation with dorsiflexion of the foot and with lateral compression and pressure to the third intermetatarsal space on the right foot.    Radiographs: right foot xray - I personally reviewed the xrays -  No acute fracture or dislocation. There is mild chronic irregularity and spurring of the navicular bone and medial cuneiform bone about the medial aspect of the " naviculocuneiform joint. Small  chronic bone fragments projecting near the medial malleolus. Achilles calcaneal spur.     ASSESSMENT:    Right foot pain  Sprain of right foot, initial encounter  Tendinitis of right foot  Interdigital neuroma of right foot       Medical Decision Making/Plan:  Reviewed patient's chart in [facility].  We discussed causes and treatments of neuromas.  These included shoe gear, orthotics, metatarsal pads, cortisone injections, ice, physical therapy, and possible surgical removal.      At this time he is going to try a boot for the next 3 to 4 weeks.  He is going to get 1 off of [employer].  This will try to help calm down the inflammation to the area.  He was also given an oral anti-inflammatory to help with pain as well.  If pain continues after 3 to 4 weeks would recommend an MRI with contrast of the right foot.    All questions were answered to patient's satisfaction and he will call further questions or concerns.    Patient risk factor: Patient is at low risk for infection.        Chelsie Patel DPM, Podiatry/Foot and Ankle Surgery

## 2023-08-02 NOTE — LETTER
8/2/2023         RE: Raji Ruvalcaba  [address]  [place] [address]        Dear Colleague,    Thank you for referring your patient, Raji Ruvalcaba, to the [facility]. Please see a copy of my visit note below.     PATIENT HISTORY:  Treasure Becker PA-C requested I see this patient for their foot issue.  Raji Ruvalcaba is a 37 year old male who presents to clinic for right foot pain/strain.  Patient states that the foot pain started about 3 weeks ago.  States when he was sleeping he felt some discomfort but woke up and was able to walk however by the end of the day it was quite sore.  This was about a month ago.  Notes it is an aching pain.  Can be 6 out of 10.  Worse if he walks a lot.  He has been icing and using ibuprofen.  Denies specific injury.  Did have x-rays.  Wondering what is causing the pain and what can be done for it.    Review of Systems:  Patient denies fever, chills, rash, wound, stiffness,numbness, weakness, heart burn, blood in stool, chest pain with activity, calf pain when walking, shortness of breath with activity, chronic cough, easy bleeding/bruising, swelling of ankles, excessive thirst, fatigue, depression, anxiety.  Patient admits to limping at times.     PAST MEDICAL HISTORY: No past medical history on file.     PAST SURGICAL HISTORY:   Past Surgical History:   Procedure Laterality Date     NO PAST SURGERIES          MEDICATIONS:   Current Outpatient Medications:      acetaminophen (TYLENOL) 325 MG tablet, Take 325-650 mg by mouth every 6 hours as needed for mild pain, Disp: , Rfl:      cetirizine (ZYRTEC) 10 MG tablet, Take 10 mg by mouth daily, Disp: , Rfl:      ALLERGIES:  No Known Allergies     SOCIAL HISTORY:   Social History     Socioeconomic History     Marital status:      Spouse name: Not on file     Number of children: Not on file     Years of education: Not on file     Highest  "education level: Not on file   Occupational History     Not on file   Tobacco Use     Smoking status: Never     Smokeless tobacco: Never   Substance and Sexual Activity     Alcohol use: Not on file     Drug use: Not on file     Sexual activity: Not on file   Other Topics Concern     Not on file   Social History Narrative     Not on file     Social Determinants of Health     Financial Resource Strain: Not on file   Food Insecurity: Not on file   Transportation Needs: Not on file   Physical Activity: Not on file   Stress: Not on file   Social Connections: Not on file   Intimate Partner Violence: Not on file   Housing Stability: Not on file        FAMILY HISTORY:   Family History   Problem Relation Age of Onset     Hyperlipidemia Mother      Hyperlipidemia Father         EXAM:Vitals: /72   Ht 1.803 m (5' 11\")   Wt 83.5 kg (184 lb)   BMI 25.66 kg/m      General appearance: Patient is alert and fully cooperative with history & exam.  No sign of distress is noted during the visit.     Psychiatric: Affect is pleasant & appropriate.  Patient appears motivated to improve health.     Respiratory: Breathing is regular & unlabored while sitting.     HEENT: Hearing is intact to spoken word.  Speech is clear.  No gross evidence of visual impairment that would impact ambulation.     Dermatologic: Skin is intact to both lower extremities without significant lesions, rash or abrasion.  No paronychia or evidence of soft tissue infection is noted.     Vascular: DP & PT pulses are intact & regular bilaterally.  No significant edema or varicosities noted.  CFT and skin temperature is normal to both lower extremities.     Neurologic: Lower extremity sensation is intact to light touch.  No evidence of weakness or contracture in the lower extremities.  No evidence of neuropathy.     Musculoskeletal: Patient is ambulatory without assistive device or brace.  Pain on palpation with dorsiflexion of the foot and with lateral " compression and pressure to the third intermetatarsal space on the right foot.    Radiographs: right foot xray - I personally reviewed the xrays -  No acute fracture or dislocation. There is mild chronic irregularity and spurring of the navicular bone and medial cuneiform bone about the medial aspect of the naviculocuneiform joint. Small  chronic bone fragments projecting near the medial malleolus. Achilles calcaneal spur.     ASSESSMENT:    Right foot pain  Sprain of right foot, initial encounter  Tendinitis of right foot  Interdigital neuroma of right foot       Medical Decision Making/Plan:  Reviewed patient's chart in [facility].  We discussed causes and treatments of neuromas.  These included shoe gear, orthotics, metatarsal pads, cortisone injections, ice, physical therapy, and possible surgical removal.      At this time he is going to try a boot for the next 3 to 4 weeks.  He is going to get 1 off of [employer].  This will try to help calm down the inflammation to the area.  He was also given an oral anti-inflammatory to help with pain as well.  If pain continues after 3 to 4 weeks would recommend an MRI with contrast of the right foot.    All questions were answered to patient's satisfaction and he will call further questions or concerns.    Patient risk factor: Patient is at low risk for infection.        Chelsie Patel DPM, Podiatry/Foot and Ankle Surgery      Again, thank you for allowing me to participate in the care of your patient.        Sincerely,        Chelsie Patel DPM, Podiatry/Foot and Ankle Surgery

## 2023-08-02 NOTE — PATIENT INSTRUCTIONS
Thank you for choosing [facility] Podiatry / Foot & Ankle Surgery!    DR [facility] CLIN[facility]:  [facility]   [address]   [place] [address]      TRIAGE LINE: [phone]  APPOINTMENTS: [phone]  RADIOLOGY: [phone]  SET UP SURGERY: [phone]  PHYSICAL THERAPY: [phone]   FAX NUMBER: [phone]  BILLING QUESTIONS: [phone]       Follow up: Call in 1 month if not improved    New Balance Inserts with Metatarsal Pad      VICTOR'S NEUROMA   Victor's neuroma is an enlargement or thickening of a nerve in the foot. It is also sometimes referred to as an intermetatarsal neuroma, interdigital neuroma, Victor's metatarsalgia (pain in the metatarsal head area), arlin-neural fibrosis (scar tissue around a nerve) or entrapment neuropathy (abnormal nerve due to compression). A Victor's neuroma most commonly occurs in the third interspace between the third and fourth toes, followed by the second interspace between the second and third toes. Victor's neuromas have also occurred in the fourth and first interspaces, but these are rare. If you have a Victor's neuroma, there is a 15% chance it will occur bilaterally (on both feet). Victor's neuromas occur most commonly in women who are between 30 to 50 years old. The reason they are more common in women is thought to be due to the shoes women wear.   CAUSES: A Victor's neuroma is thought to be caused by trauma to the nerve, but scientists are still not sure about the exact cause of the trauma. The trauma may be caused by the metatarsal heads, the deep transverse intermetatarsal ligament (holds the metatarsal heads together) or an intermetatarsal bursa (fluid-filled sac). All of these structures can cause compression/trauma on the nerve which initially causes swelling and injury in the nerve. Over time if the compression/trauma continues, the nerve repairs itself with very fibrous tissue that leads to enlargement and thickening of the  "nerve. Other causes of trauma to the nerve may include; overpronation (foot rolls inward), hypermobility (too much motion), cavo varus (high arch foot) and excessive dorsiflexion (toes bend upward) of the toes. These biomechanical (howthe foot moves) factors may cause trauma to the nerve with every step. If the nerve becomes irritated and enlarged then it takes up more space and gets even more compressed and irritated. It becomes a vicious cycle.   SIGNS & SYMPTOMS  - Pain (sharp, stabbing, throbbing, shooting)   - Numbness   - Tingling or \"pins & needles\"   - Burning   - Cramping   - Feeling that you are stepping on something or that something is in your shoe   - Initially the symptoms may happen once in a while, but as the condition gets worse, the symptoms may happen all of the time   - It usually feels better by taking off your shoe and massaging your foot     DIAGNOSIS: Your podiatrist will ask many questions about your signs and symptoms and will perform a physical exam. Some of the exams may include a web space compression test. This is done by squeezing the metatarsals together with one hand and using the thumb and index finger of the other hand to compress the affected web space to reproduce the pain/symptoms. A palpable click (Shereen's click) is usually present. This test may also cause pain to shoot into the toes and that is called a Tinel's sign. Dinesh's test involves squeezing the metatarsals together and moving the toes up and down for 30 seconds. This will usually cause pain or it will bring on your other symptoms. Sim's sign is positive when you stand and the affected toes spread apart. A Victor's neuroma is usually diagnosed based on the history and physical exam findings, but sometimes other tests such as an x-ray, ultrasound or an MRI are needed.   TREATMENT  1.  Footwear Changes: Wear shoes that are wide and deep in the toe box so they  do not put pressure on your toes and metatarsals. " Avoid wearing high heels because they cause increased pressure on the ball of your foot (forefoot).    2.  Metatarsal Pads: These help to lift and separate the metatarsal heads to take pressure off of the nerve. They are placed just behind where you feel the pain, not on top of the painful spot.   3.  Activity modification: For example, you may try swimming instead of running until your symptoms go away.   4.  Taping   5.  Icing   6.  NSAIDs (anti-inflammatories): aleve, ibuprofen, etc.   7.  Arch Supports or Orthotics: These help to control some of the abnormal motion in your feet. The abnormal motion can lead to extra torque and pressure on the nerve.   8.  Physical Therapy  9.  Cortisone Injection: Helps to decrease the size of the irritated, enlarged nerve.   10.  Sclerosing Alcohol Injection: Helps to destroy the nerve chemically, which causes permanent numbness    SURGERY  If conservative treatment does not help surgery may be needed. Surgery may involve cutting out the nerve or cutting the intermetatarsalligament. Studies have shown surgery has an 80-85% success rate.  Will result in numbness    PREVENTION  -Avoid wearing narrow, pointed toe shoes, or high heels     TENDONITIS   Tendons are the strong fibrous portions of muscles that attach to bones and allow the muscle to move a joint when it contracts. Tendons are very strong because they have a lot of force exerted on them. Sometimes tendons can become painful because they have suffered an acute injury, in which too much force was exerted at one time, or an overuse injury, in which a normal force was exerted too frequently or over a prolonged period of time. As a result, there is damage to the tendon and its surrounding soft tissue structures and they become inflammed. Because tendons do not have a great blood supply, they do not heal rapidly and the inflammation can become chronic.   Conservative treatment for tendinitis involves rest and  anti-inflammatory measures. Ice is applied 15 minutes 2-3 times daily. Anti-inflammatory medications called NSAIDs (ibuprofen, example) can be taken provided they are used with caution, as they can lead to internal bleeding and increase the risk ofstroke and heart attack. Sometimes topical nitroglycerin is prescribed to help with pain. Often your doctor will use a special shoe or removable walking cast to immobilize the tendon, allowing it to heal without further damage from use. These devices are very useful in helping tendons heal, but they may slow you down or make you feel like your hip, knee, or back are out ofalignment. This is temporary and should go away once you are out ofthe immobilization. You should not use a walking cast when showering or driving. Another option is Platelet Rich Plasma injections. (Normally done with a Sports and Orthorapedic doctor.   If conservative measures fail, your physician may need to surgically repair the tendon by removing any chronic inflammatory tissue and sewing it back together. Sometimes it is sewn to an adjacent tendon with similar function for support and sometimes it is lengthened. . Sometimes the bones around the tendon need to be realigned or reshaped to better support the tendon or prevent further damage. Your foot and ankle surgeon will discuss the specifics of your surgery with you, should you need it.    Towel stretch: Sit on a hard surface with your injured leg stretched out in front of you. Loop a towel around your toes and the ball of your foot and pull the towel toward your body keeping your leg straight. Hold this position for 15 to 30 seconds and then relax. Repeat 3 times. Then push the towel away with the ball of your foot. Repeat 3 times.  When you don't feel much of a stretch using the towel, you can start the standing calf stretch and the following exercises.  Standing calf stretch: Stand facing a wall with your hands on the wall at about eye level.  Keep your injured leg back with your heel on the floor. Keep the other leg forward with the knee bent. Turn your back foot slightly inward (as if you were pigeon-toed). Slowly lean into the wall until you feel a stretch in the back of your calf. Hold the stretch for 15 to 30 seconds. Return to the starting position. Repeat 3 times. Do this exercise several times each day.   Standing soleus stretch: Stand facing a wall with your hands on the wall at about chest height. Keep your injured leg back with your heel on the floor. Keep the other leg forward with the knee bent. Turn your back foot slightly inward (as if you were pigeon-toed). Bend your back knee slightly and gently lean into the wall until you feel a stretch in the lower calf of your injured leg. Hold the stretch for 15 to 30 seconds. Return to the starting position. Repeat 3 times.   Achilles stretch: Stand with the ball of one foot on a stair. Reach for the step below with your heel until you feel a stretch in the arch of your foot. Hold this position for 15 to 30 seconds and then relax. Repeat 3 times.   Heel raise: Balance yourself while standing behind a chair or counter. Using the chair or counter as a support to help you, raise your body up onto your toes and hold for 5 seconds. Then slowly lower yourself down without holding onto the support. (It's OK to keep holding onto the support if you need to.) When this exercise becomes less painful, try lowering yourself down on the injured leg only. Repeat 15 times. Do 2 sets of 15. Rest 30 seconds between sets.   Step-up: Stand with the foot of your injured leg on a support 3 to 5 inches high (like a small step or block of wood). Keep your other foot flat on the floor. Shift your weight onto the injured leg on the support. Straighten your injured leg as the other leg comes off the floor. Return to the starting position by bending your injured leg and slowly lowering your uninjured leg back to the floor. Do  2 sets of 15.   Resisted ankle eversion: Sit with both legs stretched out in front of you, with your feet about a shoulder's width apart. Tie a loop in one end of elastic tubing. Put the foot of your injured leg through the loop so that the tubing goes around the arch of that foot and wraps around the outside of the other foot. Hold onto the other end of the tubing with your hand to provide tension. Turn the foot of your injured leg up and out. Make sure you keep your other foot still so that it will allow the tubing to stretch as you move the foot of your injured leg. Return to the starting position. Do 2 sets of 15.   Balance and reach exercises: Stand next to a chair with your injured leg farther from the chair. The chair will provide support if you need it. Stand on the foot of your injured leg and bend your knee slightly. Try to raise the arch of this foot while keeping your big toe on the floor. Keep your foot in this position. With the hand that is farther away from the chair, reach forward in front of you by bending at the waist. Avoid bending your knee any more as you do this. Repeat this 10 times. To make the exercise more challenging, reach farther in front of you. Do 2 sets of 10.  the same position as above. While keeping your arch height, reach the hand that is farther away from the chair across your body toward the chair. The farther you reach, the more challenging the exercise. Do 2 sets of 10.   Resisted ankle eversion: Sit with both legs stretched out in front of you, with your feet about a shoulder's width apart. Tie a loop in one end of elastic tubing. Put the foot of your injured leg through the loop so that the tubing goes around the arch of that foot and wraps around the outside of the other foot. Hold onto the other end of the tubing with your hand to provide tension. Turn the foot of your injured leg up and out. Make sure you keep your other foot still so that it will allow the  tubing to stretch as you move the foot of your injured leg. Return to the starting position. Do 2 sets of 15.   If you have access to a wobble board, do the following exercises:  Wobble board exercises:   Stand on a wobble board with your feet shoulder width apart. Rock the board forwards and backwards 30 times, then side to side 30 times. Hold on to a chair if you need support.   Rotate the wobble board around so that the edge of the board is in contact with the floor at all times. Do this 30 times in a clockwise and then a counterclockwise direction.   Balance on the wobble board for as long as you can without letting the edges touch the floor. Try to do this for 2 minutes without touching the floor.   Rotate the wobble board in clockwise and counterclockwise circles, but do not let the edge of the board touch the floor.   When you have mastered exercises A through D, try repeating them while standing on just your injured leg.   After you are able to do these exercises on one leg, try to do them with your eyes closed. Make sure you have something nearby to support you in case you lose your balance.

## 2023-08-05 ENCOUNTER — HEALTH MAINTENANCE LETTER (OUTPATIENT)
Age: 38
End: 2023-08-05

## 2023-08-08 ENCOUNTER — TELEPHONE (OUTPATIENT)
Dept: PODIATRY | Facility: CLINIC | Age: 38
End: 2023-08-08
Payer: COMMERCIAL

## 2023-08-08 NOTE — TELEPHONE ENCOUNTER
Received refill request from [facility] in [place] on [facility] for diclofenac sodium 75 mg tablets.    Denied request as this was really a one-time prescription.  Patient would have to follow back up in clinic.    Faxed back to pharmacy.    Chlesie Patel DPM

## 2023-12-19 ENCOUNTER — LAB REQUISITION (OUTPATIENT)
Dept: LAB | Facility: CLINIC | Age: 38
End: 2023-12-19
Payer: COMMERCIAL

## 2023-12-19 DIAGNOSIS — E78.5 HYPERLIPIDEMIA, UNSPECIFIED: ICD-10-CM

## 2023-12-19 PROCEDURE — 80061 LIPID PANEL: CPT | Mod: ORL | Performed by: FAMILY MEDICINE

## 2023-12-20 LAB
CHOLEST SERPL-MCNC: 270 MG/DL
FASTING STATUS PATIENT QL REPORTED: ABNORMAL
HDLC SERPL-MCNC: 31 MG/DL
LDLC SERPL CALC-MCNC: 160 MG/DL
NONHDLC SERPL-MCNC: 239 MG/DL
TRIGL SERPL-MCNC: 397 MG/DL

## 2024-09-28 ENCOUNTER — HEALTH MAINTENANCE LETTER (OUTPATIENT)
Age: 39
End: 2024-09-28

## 2024-11-22 ENCOUNTER — LAB REQUISITION (OUTPATIENT)
Dept: LAB | Facility: CLINIC | Age: 39
End: 2024-11-22
Payer: COMMERCIAL

## 2024-11-22 DIAGNOSIS — E78.5 HYPERLIPIDEMIA, UNSPECIFIED: ICD-10-CM

## 2024-11-22 PROCEDURE — 83721 ASSAY OF BLOOD LIPOPROTEIN: CPT | Mod: ORL | Performed by: FAMILY MEDICINE

## 2024-11-22 PROCEDURE — 80061 LIPID PANEL: CPT | Mod: ORL | Performed by: FAMILY MEDICINE

## 2024-11-23 LAB
CHOLEST SERPL-MCNC: 242 MG/DL
FASTING STATUS PATIENT QL REPORTED: NO
HDLC SERPL-MCNC: 32 MG/DL
LDLC SERPL CALC-MCNC: ABNORMAL MG/DL
LDLC SERPL DIRECT ASSAY-MCNC: 153 MG/DL
NONHDLC SERPL-MCNC: 210 MG/DL
TRIGL SERPL-MCNC: 434 MG/DL